# Patient Record
Sex: MALE | URBAN - METROPOLITAN AREA
[De-identification: names, ages, dates, MRNs, and addresses within clinical notes are randomized per-mention and may not be internally consistent; named-entity substitution may affect disease eponyms.]

---

## 2024-06-14 ENCOUNTER — LAB REQUISITION (OUTPATIENT)
Dept: LAB | Facility: HOSPITAL | Age: 74
End: 2024-06-14
Payer: MEDICARE

## 2024-06-14 DIAGNOSIS — I25.10 ATHEROSCLEROTIC HEART DISEASE OF NATIVE CORONARY ARTERY WITHOUT ANGINA PECTORIS: ICD-10-CM

## 2024-06-14 DIAGNOSIS — F41.9 ANXIETY DISORDER, UNSPECIFIED: ICD-10-CM

## 2024-06-14 DIAGNOSIS — J44.9 CHRONIC OBSTRUCTIVE PULMONARY DISEASE, UNSPECIFIED (MULTI): ICD-10-CM

## 2024-06-14 DIAGNOSIS — E11.9 TYPE 2 DIABETES MELLITUS WITHOUT COMPLICATIONS (MULTI): ICD-10-CM

## 2024-06-14 DIAGNOSIS — F32.0 MAJOR DEPRESSIVE DISORDER, SINGLE EPISODE, MILD (CMS-HCC): ICD-10-CM

## 2024-06-14 DIAGNOSIS — Z00.00 ENCOUNTER FOR GENERAL ADULT MEDICAL EXAMINATION WITHOUT ABNORMAL FINDINGS: ICD-10-CM

## 2024-06-14 DIAGNOSIS — E55.9 VITAMIN D DEFICIENCY, UNSPECIFIED: ICD-10-CM

## 2024-06-14 DIAGNOSIS — R53.83 OTHER FATIGUE: ICD-10-CM

## 2024-06-14 DIAGNOSIS — M54.50 LOW BACK PAIN, UNSPECIFIED: ICD-10-CM

## 2024-06-14 DIAGNOSIS — I10 ESSENTIAL (PRIMARY) HYPERTENSION: ICD-10-CM

## 2024-06-14 DIAGNOSIS — E78.5 HYPERLIPIDEMIA, UNSPECIFIED: ICD-10-CM

## 2024-06-14 DIAGNOSIS — Z79.899 OTHER LONG TERM (CURRENT) DRUG THERAPY: ICD-10-CM

## 2024-06-14 DIAGNOSIS — E03.9 HYPOTHYROIDISM, UNSPECIFIED: ICD-10-CM

## 2024-06-14 LAB
ALBUMIN SERPL BCP-MCNC: 3.6 G/DL (ref 3.4–5)
ALP SERPL-CCNC: 96 U/L (ref 33–136)
ALT SERPL W P-5'-P-CCNC: 10 U/L (ref 10–52)
ANION GAP SERPL CALC-SCNC: 9 MMOL/L (ref 10–20)
AST SERPL W P-5'-P-CCNC: 14 U/L (ref 9–39)
BASOPHILS # BLD AUTO: 0.02 X10*3/UL (ref 0–0.1)
BASOPHILS NFR BLD AUTO: 0.2 %
BILIRUB SERPL-MCNC: 0.6 MG/DL (ref 0–1.2)
BUN SERPL-MCNC: 23 MG/DL (ref 6–23)
CALCIUM SERPL-MCNC: 9.1 MG/DL (ref 8.6–10.3)
CHLORIDE SERPL-SCNC: 90 MMOL/L (ref 98–107)
CO2 SERPL-SCNC: 40 MMOL/L (ref 21–32)
CREAT SERPL-MCNC: 1.11 MG/DL (ref 0.5–1.3)
EGFRCR SERPLBLD CKD-EPI 2021: 70 ML/MIN/1.73M*2
EOSINOPHIL # BLD AUTO: 0.18 X10*3/UL (ref 0–0.4)
EOSINOPHIL NFR BLD AUTO: 2.1 %
ERYTHROCYTE [DISTWIDTH] IN BLOOD BY AUTOMATED COUNT: 13.3 % (ref 11.5–14.5)
GLUCOSE SERPL-MCNC: 106 MG/DL (ref 74–99)
HCT VFR BLD AUTO: 30.4 % (ref 41–52)
HGB BLD-MCNC: 10.1 G/DL (ref 13.5–17.5)
IMM GRANULOCYTES # BLD AUTO: 0.05 X10*3/UL (ref 0–0.5)
IMM GRANULOCYTES NFR BLD AUTO: 0.6 % (ref 0–0.9)
LYMPHOCYTES # BLD AUTO: 0.83 X10*3/UL (ref 0.8–3)
LYMPHOCYTES NFR BLD AUTO: 9.7 %
MCH RBC QN AUTO: 31.7 PG (ref 26–34)
MCHC RBC AUTO-ENTMCNC: 33.2 G/DL (ref 32–36)
MCV RBC AUTO: 95 FL (ref 80–100)
MONOCYTES # BLD AUTO: 0.48 X10*3/UL (ref 0.05–0.8)
MONOCYTES NFR BLD AUTO: 5.6 %
NEUTROPHILS # BLD AUTO: 7.02 X10*3/UL (ref 1.6–5.5)
NEUTROPHILS NFR BLD AUTO: 81.8 %
NRBC BLD-RTO: 0 /100 WBCS (ref 0–0)
PLATELET # BLD AUTO: 235 X10*3/UL (ref 150–450)
POTASSIUM SERPL-SCNC: 4.3 MMOL/L (ref 3.5–5.3)
PROT SERPL-MCNC: 6.7 G/DL (ref 6.4–8.2)
RBC # BLD AUTO: 3.19 X10*6/UL (ref 4.5–5.9)
SODIUM SERPL-SCNC: 135 MMOL/L (ref 136–145)
TACROLIMUS BLD-MCNC: 7.8 NG/ML
WBC # BLD AUTO: 8.6 X10*3/UL (ref 4.4–11.3)

## 2024-06-14 PROCEDURE — 85025 COMPLETE CBC W/AUTO DIFF WBC: CPT | Mod: OUT | Performed by: INTERNAL MEDICINE

## 2024-06-14 PROCEDURE — 80197 ASSAY OF TACROLIMUS: CPT | Mod: OUT,PARLAB | Performed by: INTERNAL MEDICINE

## 2024-06-14 PROCEDURE — 87799 DETECT AGENT NOS DNA QUANT: CPT | Mod: OUT,PARLAB | Performed by: INTERNAL MEDICINE

## 2024-06-14 PROCEDURE — 80053 COMPREHEN METABOLIC PANEL: CPT | Mod: OUT | Performed by: INTERNAL MEDICINE

## 2024-06-16 LAB
CMV DNA SERPL NAA+PROBE-LOG IU: NORMAL {LOG_IU}/ML
LABORATORY COMMENT REPORT: NOT DETECTED

## 2024-06-17 LAB
EBV DNA BLD NAA+PROBE-ACNC: 743 IU/ML
EBV DNA BLD NAA+PROBE-LOG IU: 2.87 LOG IU/ML
LABORATORY COMMENT REPORT: DETECTED

## 2024-07-25 ENCOUNTER — HOME VISIT (OUTPATIENT)
Dept: POST ACUTE CARE | Facility: EXTERNAL LOCATION | Age: 74
End: 2024-07-25
Payer: MEDICARE

## 2024-07-25 DIAGNOSIS — Z94.2 LUNG TRANSPLANT RECIPIENT (MULTI): ICD-10-CM

## 2024-07-25 DIAGNOSIS — K21.9 GASTROESOPHAGEAL REFLUX DISEASE WITHOUT ESOPHAGITIS: ICD-10-CM

## 2024-07-25 DIAGNOSIS — M19.90 ARTHRITIS: ICD-10-CM

## 2024-07-25 DIAGNOSIS — R91.1 PULMONARY NODULE: ICD-10-CM

## 2024-07-25 DIAGNOSIS — J96.91 RESPIRATORY FAILURE WITH HYPOXIA, UNSPECIFIED CHRONICITY (MULTI): Primary | ICD-10-CM

## 2024-07-25 PROCEDURE — 99497 ADVNCD CARE PLAN 30 MIN: CPT | Performed by: EMERGENCY MEDICINE

## 2024-07-25 PROCEDURE — 99344 HOME/RES VST NEW MOD MDM 60: CPT | Performed by: EMERGENCY MEDICINE

## 2024-08-22 ENCOUNTER — HOME VISIT (OUTPATIENT)
Dept: POST ACUTE CARE | Facility: EXTERNAL LOCATION | Age: 74
End: 2024-08-22
Payer: MEDICARE

## 2024-08-22 DIAGNOSIS — Z94.2 LUNG TRANSPLANT RECIPIENT (MULTI): Primary | ICD-10-CM

## 2024-08-22 DIAGNOSIS — M19.90 ARTHRITIS: ICD-10-CM

## 2024-08-22 DIAGNOSIS — J96.91 RESPIRATORY FAILURE WITH HYPOXIA, UNSPECIFIED CHRONICITY (MULTI): ICD-10-CM

## 2024-08-22 DIAGNOSIS — E78.5 HYPERLIPIDEMIA, UNSPECIFIED HYPERLIPIDEMIA TYPE: ICD-10-CM

## 2024-08-22 DIAGNOSIS — K21.9 GASTROESOPHAGEAL REFLUX DISEASE WITHOUT ESOPHAGITIS: ICD-10-CM

## 2024-08-22 DIAGNOSIS — F03.B0 MODERATE DEMENTIA, UNSPECIFIED DEMENTIA TYPE, UNSPECIFIED WHETHER BEHAVIORAL, PSYCHOTIC, OR MOOD DISTURBANCE OR ANXIETY (MULTI): ICD-10-CM

## 2024-08-22 PROCEDURE — 99348 HOME/RES VST EST LOW MDM 30: CPT | Performed by: EMERGENCY MEDICINE

## 2024-08-29 PROBLEM — M19.90 ARTHRITIS: Status: ACTIVE | Noted: 2024-08-29

## 2024-08-29 PROBLEM — F03.B0 MODERATE DEMENTIA (MULTI): Status: ACTIVE | Noted: 2024-08-29

## 2024-08-29 PROBLEM — J96.91 RESPIRATORY FAILURE WITH HYPOXIA (MULTI): Status: ACTIVE | Noted: 2024-08-29

## 2024-08-29 PROBLEM — E78.5 HYPERLIPIDEMIA: Status: ACTIVE | Noted: 2024-08-29

## 2024-08-29 PROBLEM — Z94.2 LUNG TRANSPLANT RECIPIENT (MULTI): Status: ACTIVE | Noted: 2024-08-29

## 2024-08-29 PROBLEM — K21.9 GASTROESOPHAGEAL REFLUX DISEASE WITHOUT ESOPHAGITIS: Status: ACTIVE | Noted: 2024-08-29

## 2024-08-29 NOTE — PROGRESS NOTES
Chi Stewart   74 y.o.  male  @location@            Assessment and Plan:  History and physical    ACUTE RESPIRATORY FAILURE WITH HYPOXIA 7/15/2024 Primary Diagnosis 7/15/2024 [email or web address]  view    M62.81  MUSCLE WEAKNESS (GENERALIZED) 7/15/2024 Secondary Diagnosis 7/15/2024 [email or web address]  view    Z94.2  LUNG TRANSPLANT STATUS 7/15/2024  7/15/2024 [email or web address]  view    R91.1  SOLITARY PULMONARY NODULE 7/15/2024  7/15/2024 [email or web address]    ACIDOPHILUS CAPSULE (Lactobacillus)   TAKE (1) CAPSULE BY MOUTH DAILY.(Indications for use: Supplement)(Additional Directions: HOSP )  Pharmacy Active 7/23/2024 08:00  8/16/2024  DICLOFENAC SODIUM 1% GEL OT (VOLTAREN 1% GEL) (Diclofenac Sodium (Topical))   APPLY 4 GRAMS TOPICALLY TO BACK FOUR TIMES A DAY AS NEEDED **2 GM FOR UPPER EXTREMITY, AND 4 GM FOR LOWER EXTREMITY**MAX TOTAL DAILY DOSE 32GM/24HRS(Indications for use: Discomfort)(Additional Directions: HOSP )  Pharmacy Active 7/25/2024 19:00  7/25/2024  PREDNISONE 5 MG TABLET (Prednisone)   TAKE (1) TABLET BY MOUTH DAILY.(Indications for use: Inflammation)(Additional Directions: HOSP )  Pharmacy Active 7/23/2024 08:00  8/3/2024  POTASSIUM CL ER 20 MEQ TABL (Potassium Chloride Microencapsulated Crystals ER)   TAKE (1) TABLET BY MOUTH DAILY.(Indications for use: Supplement)  Pharmacy Active 7/31/2024 08:00  7/30/2024  ANTI-EM KNEE HIGH MED/LONG   USE AS DIRECTED ON IN MORNING, OFF IN EVENING(Indications for use: Edema)  Pharmacy Active 8/3/2024 08:00  8/5/2024  OCUSOFT PRE-MOIST PADS (Eyelid Cleansers)   USE AS DIRECTED TO CLEAN BILATERAL EYELIDS ONCE DAILY  Pharmacy Active 8/7/2024 08:00  8/6/2024  POLYETHYLENE GLYCOL 3350 PO (MIRALAX POWDER) (Polyethylene Glycol 3350)   DISSOLVE 1 CAPFUL (17 GRAMS) IN 8 OUNCES OF LIQUID & TAKE BY MOUTH ONCE DAILY AS NEEDED(Indications for use: constipation)  Pharmacy Active 8/8/2024 23:00  8/9/2024  CHEST  CONGESTION RELIEF SOL (Guaifenesin)   TAKE 10 ML BY MOUTH 2 TIMES DAILY AS NEEDED(Indications for use: cough)  Pharmacy Active 8/9/2024 06:00  8/9/2024  DOCUSATE SODIUM 100 MG SOFT (Docusate Sodium)   TAKE (1) CAPSULE BY MOUTH DAILY AS NEEDED(Indications for use: constipation)  Pharmacy Active 8/9/2024 00:00  8/9/2024  IPRAT-ALBUT 0.5-3(2.5) MG/3 (Ipratropium-Albuterol)   INHALE CONTENTS OF 1 VIAL VIA NEBULIZER EVERY 4 HOURS AS NEEDED.  Pharmacy Active 8/20/2024 15:41  8/20/2024  NEBULIZER MASK W/ 7' TUBING   USE AS DIRECTED.  Pharmacy Active 8/20/2024 14:00  8/21/2024  COMP-AIR NEBULIZER SYSTEM   USE AS DIRECTED.  Pharmacy Active 8/20/2024 00:00  8/21/2024    I discussed advanced care planning for more than 16 minutes including the explanation and discussion of advanced directives. Information and advise was also provided on DO NOT RESUSCITATE and patient encouraged to consider this  Patient is not sure about DNR at this time.      Source of history: Nurse, Medical personnel, Medical record, Patient.  History limitation: None.    HPI:  History and physical    Patient is unable to give any detailed history and therefore history is obtained from the chart  No acute complaints voiced by the patient or acute concerns raised by nursing    Allergies No Known Allergies Social History   Alcohol - Denies Alcohol Use   Substance Abuse - Denies Substance Abuse      Physical Exam:  Vital signs as per nursing/MA documentation were reviewed  General appearance: Alert and in no acute distress  HEENT: Normal Inspection  Neck - Normal Inspection  Respiratory : No respiratory distress. Lungs are clear   Cardiovascular: heart rate normal. No gallop  Back - normal inspection  Skin inspection:Warm  Musculoskeletal : No deformities  Neuro : Limited exam. Baseline    ROS: Review of symptoms is negative except for what is mentioned in HPI    Results/Data  Records including but not limited to electronic medical records, relevant lab work  and imaging from patient's health care facility encounter were reviewed and independently verified      Charting was completed using voice recognition technology and may include unintended errors.    Discussed with patient/family, RN, and NP.

## 2024-08-29 NOTE — PROGRESS NOTES
Chi Stewart   74 y.o.  male  @location@            Assessment and Plan:      ACUTE RESPIRATORY FAILURE WITH HYPOXIA 7/15/2024 Primary Diagnosis 7/15/2024 [email or web address]  view    M62.81  MUSCLE WEAKNESS (GENERALIZED) 7/15/2024 Secondary Diagnosis 7/15/2024 [email or web address]  view    Z94.2  LUNG TRANSPLANT STATUS 7/15/2024  7/15/2024 [email or web address]  view    R91.1  SOLITARY PULMONARY NODULE 7/15/2024  7/15/2024 [email or web address]    ACIDOPHILUS CAPSULE (Lactobacillus)   TAKE (1) CAPSULE BY MOUTH DAILY.(Indications for use: Supplement)(Additional Directions: HOSP )  Pharmacy Active 7/23/2024 08:00  8/16/2024  DICLOFENAC SODIUM 1% GEL OT (VOLTAREN 1% GEL) (Diclofenac Sodium (Topical))   APPLY 4 GRAMS TOPICALLY TO BACK FOUR TIMES A DAY AS NEEDED **2 GM FOR UPPER EXTREMITY, AND 4 GM FOR LOWER EXTREMITY**MAX TOTAL DAILY DOSE 32GM/24HRS(Indications for use: Discomfort)(Additional Directions: HOSP )  Pharmacy Active 7/25/2024 19:00  7/25/2024  PREDNISONE 5 MG TABLET (Prednisone)   TAKE (1) TABLET BY MOUTH DAILY.(Indications for use: Inflammation)(Additional Directions: HOSP )  Pharmacy Active 7/23/2024 08:00  8/3/2024  POTASSIUM CL ER 20 MEQ TABL (Potassium Chloride Microencapsulated Crystals ER)   TAKE (1) TABLET BY MOUTH DAILY.(Indications for use: Supplement)  Pharmacy Active 7/31/2024 08:00  7/30/2024  ANTI-EM KNEE HIGH MED/LONG   USE AS DIRECTED ON IN MORNING, OFF IN EVENING(Indications for use: Edema)  Pharmacy Active 8/3/2024 08:00  8/5/2024  OCUSOFT PRE-MOIST PADS (Eyelid Cleansers)   USE AS DIRECTED TO CLEAN BILATERAL EYELIDS ONCE DAILY  Pharmacy Active 8/7/2024 08:00  8/6/2024  POLYETHYLENE GLYCOL 3350 PO (MIRALAX POWDER) (Polyethylene Glycol 3350)   DISSOLVE 1 CAPFUL (17 GRAMS) IN 8 OUNCES OF LIQUID & TAKE BY MOUTH ONCE DAILY AS NEEDED(Indications for use: constipation)  Pharmacy Active 8/8/2024 23:00  8/9/2024  CHEST CONGESTION RELIEF SOL  (Guaifenesin)   TAKE 10 ML BY MOUTH 2 TIMES DAILY AS NEEDED(Indications for use: cough)  Pharmacy Active 8/9/2024 06:00  8/9/2024  DOCUSATE SODIUM 100 MG SOFT (Docusate Sodium)   TAKE (1) CAPSULE BY MOUTH DAILY AS NEEDED(Indications for use: constipation)  Pharmacy Active 8/9/2024 00:00  8/9/2024  IPRAT-ALBUT 0.5-3(2.5) MG/3 (Ipratropium-Albuterol)   INHALE CONTENTS OF 1 VIAL VIA NEBULIZER EVERY 4 HOURS AS NEEDED.  Pharmacy Active 8/20/2024 15:41  8/20/2024  NEBULIZER MASK W/ 7' TUBING   USE AS DIRECTED.  Pharmacy Active 8/20/2024 14:00  8/21/2024  COMP-AIR NEBULIZER SYSTEM   USE AS DIRECTED.  Pharmacy Active 8/20/2024 00:00  8/21/2024    -Fall prevention    -Cognitive engagement     -Monitor and treat blood pressure     -Aggressive decubitus ulcer prevention.     -Bowel and bladder care     -Optimal nutrition and supplementation as needed     -GI  and DVT prophylaxis     -Symptom control     -Ambulation as tolerated     -Will follow    Charting is done using voice recognition software and may contain errors which have not been completely corrected      Source of history: Nurse, Medical personnel, Medical record, Patient.  History limitation: None.    HPI:      Patient is unable to give any detailed history and therefore history is obtained from the chart  No acute complaints voiced by the patient or acute concerns raised by nursing    Allergies No Known Allergies Social History   Alcohol - Denies Alcohol Use   Substance Abuse - Denies Substance Abuse      Physical Exam:  Vital signs as per nursing/MA documentation were reviewed  General appearance: Alert and in no acute distress  HEENT: Normal Inspection  Neck - Normal Inspection  Respiratory : No respiratory distress. Lungs are clear   Cardiovascular: heart rate normal. No gallop  Back - normal inspection  Skin inspection:Warm  Musculoskeletal : No deformities  Neuro : Limited exam. Baseline    ROS: Review of symptoms is negative except for what is mentioned in  HPI    Results/Data  Records including but not limited to electronic medical records, relevant lab work and imaging from patient's health care facility encounter were reviewed and independently verified      Charting was completed using voice recognition technology and may include unintended errors.    Discussed with patient/family, RN, and NP.   61.2

## 2024-09-24 ENCOUNTER — NURSING HOME VISIT (OUTPATIENT)
Dept: POST ACUTE CARE | Facility: EXTERNAL LOCATION | Age: 74
End: 2024-09-24
Payer: MEDICARE

## 2024-09-24 DIAGNOSIS — F03.B0 MODERATE DEMENTIA, UNSPECIFIED DEMENTIA TYPE, UNSPECIFIED WHETHER BEHAVIORAL, PSYCHOTIC, OR MOOD DISTURBANCE OR ANXIETY: Primary | ICD-10-CM

## 2024-09-24 DIAGNOSIS — E78.5 HYPERLIPIDEMIA, UNSPECIFIED HYPERLIPIDEMIA TYPE: ICD-10-CM

## 2024-09-24 DIAGNOSIS — J96.91 RESPIRATORY FAILURE WITH HYPOXIA, UNSPECIFIED CHRONICITY (MULTI): ICD-10-CM

## 2024-09-24 DIAGNOSIS — K21.9 GASTROESOPHAGEAL REFLUX DISEASE WITHOUT ESOPHAGITIS: ICD-10-CM

## 2024-09-24 NOTE — LETTER
Patient: Chi Stewart  : 1950    Encounter Date: 2024    Chi Stewart   74 y.o.  male  @location@            Assessment and Plan:      ACUTE RESPIRATORY FAILURE WITH HYPOXIA 7/15/2024 Primary Diagnosis 7/15/2024 [email or web address]  view    M62.81  MUSCLE WEAKNESS (GENERALIZED) 7/15/2024 Secondary Diagnosis 7/15/2024 [email or web address]  view    Z94.2  LUNG TRANSPLANT STATUS 7/15/2024  7/15/2024 [email or web address]  view    R91.1  SOLITARY PULMONARY NODULE 7/15/2024  7/15/2024 [email or web address]    ACIDOPHILUS CAPSULE (Lactobacillus)   TAKE (1) CAPSULE BY MOUTH DAILY.(Indications for use: Supplement)(Additional Directions: HOSP )  Pharmacy Active 2024 08:00  2024  DICLOFENAC SODIUM 1% GEL OT (VOLTAREN 1% GEL) (Diclofenac Sodium (Topical))   APPLY 4 GRAMS TOPICALLY TO BACK FOUR TIMES A DAY AS NEEDED **2 GM FOR UPPER EXTREMITY, AND 4 GM FOR LOWER EXTREMITY**MAX TOTAL DAILY DOSE 32GM/24HRS(Indications for use: Discomfort)(Additional Directions: [facility])  Pharmacy Active 2024 19:00  2024  PREDNISONE 5 MG TABLET (Prednisone)   TAKE (1) TABLET BY MOUTH DAILY.(Indications for use: Inflammation)(Additional Directions: [facility])  Pharmacy Active 2024 08:00  8/3/2024  POTASSIUM CL ER 20 MEQ TABL (Potassium Chloride Microencapsulated Crystals ER)   TAKE (1) TABLET BY MOUTH DAILY.(Indications for use: Supplement)  Pharmacy Active 2024 08:00  2024  ANTI-EM KNEE HIGH MED/LONG   USE AS DIRECTED ON IN MORNING, OFF IN EVENING(Indications for use: Edema)  Pharmacy Active 8/3/2024 08:00  2024  OCUSOFT PRE-MOIST PADS (Eyelid Cleansers)   USE AS DIRECTED TO CLEAN BILATERAL EYELIDS ONCE DAILY  Pharmacy Active 2024 08:00  2024  POLYETHYLENE GLYCOL 3350 PO (MIRALAX POWDER) (Polyethylene Glycol 3350)   DISSOLVE 1 CAPFUL (17 GRAMS) IN 8 OUNCES OF LIQUID & TAKE BY MOUTH ONCE DAILY AS NEEDED(Indications for use:  constipation)  Pharmacy Active 8/8/2024 23:00  8/9/2024  CHEST CONGESTION RELIEF SOL (Guaifenesin)   TAKE 10 ML BY MOUTH 2 TIMES DAILY AS NEEDED(Indications for use: cough)  Pharmacy Active 8/9/2024 06:00  8/9/2024  DOCUSATE SODIUM 100 MG SOFT (Docusate Sodium)   TAKE (1) CAPSULE BY MOUTH DAILY AS NEEDED(Indications for use: constipation)  Pharmacy Active 8/9/2024 00:00  8/9/2024  IPRAT-ALBUT 0.5-3(2.5) MG/3 (Ipratropium-Albuterol)   INHALE CONTENTS OF 1 VIAL VIA NEBULIZER EVERY 4 HOURS AS NEEDED.  Pharmacy Active 8/20/2024 15:41  8/20/2024  NEBULIZER MASK W/ 7' TUBING   USE AS DIRECTED.  Pharmacy Active 8/20/2024 14:00  8/21/2024  COMP-AIR NEBULIZER SYSTEM   USE AS DIRECTED.  Pharmacy Active 8/20/2024 00:00  8/21/2024    Rx list reviewed.   PT and OT evaluation is in the process.   Routine safety measures, fall precautions, risk modification and alarm placement if needed for prevention of falls.   Skin care precautions, prevention of pressures sores at pressure points assessed.   Pt needs to be monitored frequently by nursing staff particularly at night time.   Any confusion, agitation or behavioural disturbance needs to be attended, as per home policy   rapid covid Ag assay need to be done, notify if positive.   If needed appropriate measures to be taken for alarm placements and assisted devices, pt was told not to get up and ambulate at night unless help and assist available at bedside,   labs will be done as per our routine protocol.   PO intake need to be monitored if consuming po.       Charting is done using voice recognition software and may contain errors which have not been completely corrected        Source of history: Nurse, Medical personnel, Medical record, Patient.  History limitation: None.    HPI:      Patient is unable to give any detailed history and therefore history is obtained from the chart  No acute complaints voiced by the patient or acute concerns raised by nursing    Allergies No Known  Allergies Social History   Alcohol - Denies Alcohol Use   Substance Abuse - Denies Substance Abuse      Physical Exam:  Vital signs as per nursing/MA documentation were reviewed  General appearance: Alert and in no acute distress  HEENT: Normal Inspection  Neck - Normal Inspection  Respiratory : No respiratory distress. Lungs are clear   Cardiovascular: heart rate normal. No gallop  Back - normal inspection  Skin inspection:Warm  Musculoskeletal : No deformities  Neuro : Limited exam. Baseline    ROS: Review of symptoms is negative except for what is mentioned in HPI    Results/Data  Records including but not limited to electronic medical records, relevant lab work and imaging from patient's health care facility encounter were reviewed and independently verified      Charting was completed using voice recognition technology and may include unintended errors.    Discussed with patient/family, RN, and NP.      Electronically Signed By: Tee Sanders MD   9/27/24  6:08 PM

## 2024-09-25 NOTE — PROGRESS NOTES
Chi Stewart   74 y.o.  male  @location@            Assessment and Plan:      ACUTE RESPIRATORY FAILURE WITH HYPOXIA 7/15/2024 Primary Diagnosis 7/15/2024 [email or web address]  view    M62.81  MUSCLE WEAKNESS (GENERALIZED) 7/15/2024 Secondary Diagnosis 7/15/2024 [email or web address]  view    Z94.2  LUNG TRANSPLANT STATUS 7/15/2024  7/15/2024 [email or web address]  view    R91.1  SOLITARY PULMONARY NODULE 7/15/2024  7/15/2024 [email or web address]    ACIDOPHILUS CAPSULE (Lactobacillus)   TAKE (1) CAPSULE BY MOUTH DAILY.(Indications for use: Supplement)(Additional Directions: HOSP )  Pharmacy Active 7/23/2024 08:00  8/16/2024  DICLOFENAC SODIUM 1% GEL OT (VOLTAREN 1% GEL) (Diclofenac Sodium (Topical))   APPLY 4 GRAMS TOPICALLY TO BACK FOUR TIMES A DAY AS NEEDED **2 GM FOR UPPER EXTREMITY, AND 4 GM FOR LOWER EXTREMITY**MAX TOTAL DAILY DOSE 32GM/24HRS(Indications for use: Discomfort)(Additional Directions: HOSP )  Pharmacy Active 7/25/2024 19:00  7/25/2024  PREDNISONE 5 MG TABLET (Prednisone)   TAKE (1) TABLET BY MOUTH DAILY.(Indications for use: Inflammation)(Additional Directions: HOSP )  Pharmacy Active 7/23/2024 08:00  8/3/2024  POTASSIUM CL ER 20 MEQ TABL (Potassium Chloride Microencapsulated Crystals ER)   TAKE (1) TABLET BY MOUTH DAILY.(Indications for use: Supplement)  Pharmacy Active 7/31/2024 08:00  7/30/2024  ANTI-EM KNEE HIGH MED/LONG   USE AS DIRECTED ON IN MORNING, OFF IN EVENING(Indications for use: Edema)  Pharmacy Active 8/3/2024 08:00  8/5/2024  OCUSOFT PRE-MOIST PADS (Eyelid Cleansers)   USE AS DIRECTED TO CLEAN BILATERAL EYELIDS ONCE DAILY  Pharmacy Active 8/7/2024 08:00  8/6/2024  POLYETHYLENE GLYCOL 3350 PO (MIRALAX POWDER) (Polyethylene Glycol 3350)   DISSOLVE 1 CAPFUL (17 GRAMS) IN 8 OUNCES OF LIQUID & TAKE BY MOUTH ONCE DAILY AS NEEDED(Indications for use: constipation)  Pharmacy Active 8/8/2024 23:00  8/9/2024  CHEST CONGESTION RELIEF SOL  (Guaifenesin)   TAKE 10 ML BY MOUTH 2 TIMES DAILY AS NEEDED(Indications for use: cough)  Pharmacy Active 8/9/2024 06:00  8/9/2024  DOCUSATE SODIUM 100 MG SOFT (Docusate Sodium)   TAKE (1) CAPSULE BY MOUTH DAILY AS NEEDED(Indications for use: constipation)  Pharmacy Active 8/9/2024 00:00  8/9/2024  IPRAT-ALBUT 0.5-3(2.5) MG/3 (Ipratropium-Albuterol)   INHALE CONTENTS OF 1 VIAL VIA NEBULIZER EVERY 4 HOURS AS NEEDED.  Pharmacy Active 8/20/2024 15:41  8/20/2024  NEBULIZER MASK W/ 7' TUBING   USE AS DIRECTED.  Pharmacy Active 8/20/2024 14:00  8/21/2024  COMP-AIR NEBULIZER SYSTEM   USE AS DIRECTED.  Pharmacy Active 8/20/2024 00:00  8/21/2024    Rx list reviewed.   PT and OT evaluation is in the process.   Routine safety measures, fall precautions, risk modification and alarm placement if needed for prevention of falls.   Skin care precautions, prevention of pressures sores at pressure points assessed.   Pt needs to be monitored frequently by nursing staff particularly at night time.   Any confusion, agitation or behavioural disturbance needs to be attended, as per home policy   rapid covid Ag assay need to be done, notify if positive.   If needed appropriate measures to be taken for alarm placements and assisted devices, pt was told not to get up and ambulate at night unless help and assist available at bedside,   labs will be done as per our routine protocol.   PO intake need to be monitored if consuming po.       Charting is done using voice recognition software and may contain errors which have not been completely corrected        Source of history: Nurse, Medical personnel, Medical record, Patient.  History limitation: None.    HPI:      Patient is unable to give any detailed history and therefore history is obtained from the chart  No acute complaints voiced by the patient or acute concerns raised by nursing    Allergies No Known Allergies Social History   Alcohol - Denies Alcohol Use   Substance Abuse - Denies  Substance Abuse      Physical Exam:  Vital signs as per nursing/MA documentation were reviewed  General appearance: Alert and in no acute distress  HEENT: Normal Inspection  Neck - Normal Inspection  Respiratory : No respiratory distress. Lungs are clear   Cardiovascular: heart rate normal. No gallop  Back - normal inspection  Skin inspection:Warm  Musculoskeletal : No deformities  Neuro : Limited exam. Baseline    ROS: Review of symptoms is negative except for what is mentioned in HPI    Results/Data  Records including but not limited to electronic medical records, relevant lab work and imaging from patient's health care facility encounter were reviewed and independently verified      Charting was completed using voice recognition technology and may include unintended errors.    Discussed with patient/family, RN, and NP.

## 2024-10-24 ENCOUNTER — NURSING HOME VISIT (OUTPATIENT)
Dept: POST ACUTE CARE | Facility: EXTERNAL LOCATION | Age: 74
End: 2024-10-24
Payer: MEDICARE

## 2024-10-24 DIAGNOSIS — E78.5 HYPERLIPIDEMIA, UNSPECIFIED HYPERLIPIDEMIA TYPE: Primary | ICD-10-CM

## 2024-10-24 DIAGNOSIS — J96.91 RESPIRATORY FAILURE WITH HYPOXIA, UNSPECIFIED CHRONICITY (MULTI): ICD-10-CM

## 2024-10-24 DIAGNOSIS — F03.B0 MODERATE DEMENTIA, UNSPECIFIED DEMENTIA TYPE, UNSPECIFIED WHETHER BEHAVIORAL, PSYCHOTIC, OR MOOD DISTURBANCE OR ANXIETY: ICD-10-CM

## 2024-10-24 DIAGNOSIS — K21.9 GASTROESOPHAGEAL REFLUX DISEASE WITHOUT ESOPHAGITIS: ICD-10-CM

## 2024-10-24 PROCEDURE — 99348 HOME/RES VST EST LOW MDM 30: CPT | Performed by: EMERGENCY MEDICINE

## 2024-11-19 ENCOUNTER — HOME VISIT (OUTPATIENT)
Dept: POST ACUTE CARE | Facility: EXTERNAL LOCATION | Age: 74
End: 2024-11-19
Payer: MEDICARE

## 2024-11-19 DIAGNOSIS — M19.90 ARTHRITIS: ICD-10-CM

## 2024-11-19 DIAGNOSIS — J96.91 RESPIRATORY FAILURE WITH HYPOXIA, UNSPECIFIED CHRONICITY (MULTI): ICD-10-CM

## 2024-11-19 DIAGNOSIS — F03.90 ADVANCING DEMENTIA (MULTI): ICD-10-CM

## 2024-11-19 DIAGNOSIS — E78.5 HYPERLIPIDEMIA, UNSPECIFIED HYPERLIPIDEMIA TYPE: Primary | ICD-10-CM

## 2024-11-19 PROCEDURE — 99349 HOME/RES VST EST MOD MDM 40: CPT | Performed by: EMERGENCY MEDICINE

## 2024-11-24 PROBLEM — F03.90 ADVANCING DEMENTIA (MULTI): Status: ACTIVE | Noted: 2024-08-29

## 2024-11-24 NOTE — PROGRESS NOTES
Chi Stewart   74 y.o.  male  @location@            Assessment and Plan:      ACUTE RESPIRATORY FAILURE WITH HYPOXIA 7/15/2024 Primary Diagnosis 7/15/2024 [email or web address]  view    M62.81  MUSCLE WEAKNESS (GENERALIZED) 7/15/2024 Secondary Diagnosis 7/15/2024 [email or web address]  view    Z94.2  LUNG TRANSPLANT STATUS 7/15/2024  7/15/2024 [email or web address]  view    R91.1  SOLITARY PULMONARY NODULE 7/15/2024  7/15/2024 [email or web address]    ACIDOPHILUS CAPSULE (Lactobacillus)   TAKE (1) CAPSULE BY MOUTH DAILY.(Indications for use: Supplement)(Additional Directions: HOSP )  Pharmacy Active 7/23/2024 08:00  8/16/2024  DICLOFENAC SODIUM 1% GEL OT (VOLTAREN 1% GEL) (Diclofenac Sodium (Topical))   APPLY 4 GRAMS TOPICALLY TO BACK FOUR TIMES A DAY AS NEEDED **2 GM FOR UPPER EXTREMITY, AND 4 GM FOR LOWER EXTREMITY**MAX TOTAL DAILY DOSE 32GM/24HRS(Indications for use: Discomfort)(Additional Directions: HOSP )  Pharmacy Active 7/25/2024 19:00  7/25/2024  PREDNISONE 5 MG TABLET (Prednisone)   TAKE (1) TABLET BY MOUTH DAILY.(Indications for use: Inflammation)(Additional Directions: HOSP )  Pharmacy Active 7/23/2024 08:00  8/3/2024  POTASSIUM CL ER 20 MEQ TABL (Potassium Chloride Microencapsulated Crystals ER)   TAKE (1) TABLET BY MOUTH DAILY.(Indications for use: Supplement)  Pharmacy Active 7/31/2024 08:00  7/30/2024  ANTI-EM KNEE HIGH MED/LONG   USE AS DIRECTED ON IN MORNING, OFF IN EVENING(Indications for use: Edema)  Pharmacy Active 8/3/2024 08:00  8/5/2024  OCUSOFT PRE-MOIST PADS (Eyelid Cleansers)   USE AS DIRECTED TO CLEAN BILATERAL EYELIDS ONCE DAILY  Pharmacy Active 8/7/2024 08:00  8/6/2024  POLYETHYLENE GLYCOL 3350 PO (MIRALAX POWDER) (Polyethylene Glycol 3350)   DISSOLVE 1 CAPFUL (17 GRAMS) IN 8 OUNCES OF LIQUID & TAKE BY MOUTH ONCE DAILY AS NEEDED(Indications for use: constipation)  Pharmacy Active 8/8/2024 23:00  8/9/2024  CHEST CONGESTION RELIEF SOL  (Guaifenesin)   TAKE 10 ML BY MOUTH 2 TIMES DAILY AS NEEDED(Indications for use: cough)  Pharmacy Active 8/9/2024 06:00  8/9/2024  DOCUSATE SODIUM 100 MG SOFT (Docusate Sodium)   TAKE (1) CAPSULE BY MOUTH DAILY AS NEEDED(Indications for use: constipation)  Pharmacy Active 8/9/2024 00:00  8/9/2024  IPRAT-ALBUT 0.5-3(2.5) MG/3 (Ipratropium-Albuterol)   INHALE CONTENTS OF 1 VIAL VIA NEBULIZER EVERY 4 HOURS AS NEEDED.  Pharmacy Active 8/20/2024 15:41  8/20/2024  NEBULIZER MASK W/ 7' TUBING   USE AS DIRECTED.  Pharmacy Active 8/20/2024 14:00  8/21/2024  COMP-AIR NEBULIZER SYSTEM   USE AS DIRECTED.  Pharmacy Active 8/20/2024 00:00  8/21/2024    This patient was seen for my regular monthly visit, nursing evaluations and nursing notes were reviewed, interim events are reviewed, interim concerns and messages were reviewed as we have communicated with nursing staff.  Any issues with the falls, skin care impairment, declining physical condition are reviewed and noted, diagnosis list were reviewed, list of medications were reviewed, living will related issues were reviewed, overall patient has been doing well, any declining in patient's condition or any change in patient's condition needs to be notified to physician promptly, discussed with nursing staff, if needed would communicate with family.  Patient stays confined here at the facility for long-term care, there are always concerns about long-term care related issues and concerns.  Nursing staff is trying their best to keep patient safe, all sort of measures has been taken to keep patient safe and comfortable.             Source of history: Nurse, Medical personnel, Medical record, Patient.  History limitation: None.    HPI:      Patient is unable to give any detailed history and therefore history is obtained from the chart  No acute complaints voiced by the patient or acute concerns raised by nursing    Allergies No Known Allergies Social History   Alcohol - Denies Alcohol  Use   Substance Abuse - Denies Substance Abuse      Physical Exam:  Vital signs as per nursing/MA documentation were reviewed  General appearance: Alert and in no acute distress  HEENT: Normal Inspection  Neck - Normal Inspection  Respiratory : No respiratory distress. Lungs are clear   Cardiovascular: heart rate normal. No gallop  Back - normal inspection  Skin inspection:Warm  Musculoskeletal : No deformities  Neuro : Limited exam. Baseline    ROS: Review of symptoms is negative except for what is mentioned in HPI    Results/Data  Records including but not limited to electronic medical records, relevant lab work and imaging from patient's health care facility encounter were reviewed and independently verified      Charting was completed using voice recognition technology and may include unintended errors.    Discussed with patient/family, RN, and NP.